# Patient Record
Sex: FEMALE
[De-identification: names, ages, dates, MRNs, and addresses within clinical notes are randomized per-mention and may not be internally consistent; named-entity substitution may affect disease eponyms.]

---

## 2022-12-27 PROBLEM — Z00.00 ENCOUNTER FOR PREVENTIVE HEALTH EXAMINATION: Status: ACTIVE | Noted: 2022-12-27

## 2023-01-04 ENCOUNTER — APPOINTMENT (OUTPATIENT)
Dept: RADIATION ONCOLOGY | Facility: CLINIC | Age: 63
End: 2023-01-04
Payer: MEDICAID

## 2023-01-04 VITALS
TEMPERATURE: 96.98 F | BODY MASS INDEX: 16.11 KG/M2 | DIASTOLIC BLOOD PRESSURE: 88 MMHG | WEIGHT: 102.62 LBS | SYSTOLIC BLOOD PRESSURE: 148 MMHG | HEART RATE: 113 BPM | OXYGEN SATURATION: 100 % | RESPIRATION RATE: 18 BRPM | HEIGHT: 67 IN

## 2023-01-04 DIAGNOSIS — Z78.9 OTHER SPECIFIED HEALTH STATUS: ICD-10-CM

## 2023-01-04 DIAGNOSIS — C53.9 MALIGNANT NEOPLASM OF CERVIX UTERI, UNSPECIFIED: ICD-10-CM

## 2023-01-04 DIAGNOSIS — Z83.3 FAMILY HISTORY OF DIABETES MELLITUS: ICD-10-CM

## 2023-01-04 DIAGNOSIS — Z92.3 PERSONAL HISTORY OF IRRADIATION: ICD-10-CM

## 2023-01-04 DIAGNOSIS — Z86.79 PERSONAL HISTORY OF OTHER DISEASES OF THE CIRCULATORY SYSTEM: ICD-10-CM

## 2023-01-04 DIAGNOSIS — Z92.21 PERSONAL HISTORY OF ANTINEOPLASTIC CHEMOTHERAPY: ICD-10-CM

## 2023-01-04 DIAGNOSIS — Z80.3 FAMILY HISTORY OF MALIGNANT NEOPLASM OF BREAST: ICD-10-CM

## 2023-01-04 PROCEDURE — 99204 OFFICE O/P NEW MOD 45 MIN: CPT | Mod: GC,25

## 2023-01-04 RX ORDER — MAGNESIUM OXIDE 400 MG
400 TABLET ORAL
Refills: 0 | Status: ACTIVE | COMMUNITY

## 2023-01-04 RX ORDER — AMLODIPINE BESYLATE 5 MG/1
5 TABLET ORAL
Refills: 0 | Status: ACTIVE | COMMUNITY

## 2023-01-05 PROBLEM — Z92.21 HISTORY OF ANTINEOPLASTIC CHEMOTHERAPY: Status: ACTIVE | Noted: 2023-01-05

## 2023-01-05 PROBLEM — Z92.3 H/O THERAPEUTIC RADIATION: Status: ACTIVE | Noted: 2023-01-05

## 2023-01-05 PROBLEM — C53.9 CERVICAL CARCINOMA: Status: ACTIVE | Noted: 2023-01-05

## 2023-01-05 NOTE — HISTORY OF PRESENT ILLNESS
[FreeTextEntry1] : Ms. Sana Noel is a 61 yo woman, seen today for second opinion regarding brachytherapy following cisplat/external beam radiation for probable Stage LIGIA cervical cancer \par \par Her HPI is summarized below:\par  admitted to the hospital on 10/11/22 for AMS and was found to have B/L hydronephrosis secondary to a pelvic mass s/p bilateral percutaneous nephrostomy. Cervical mass biopsy was performed along with cystoscopy, proctoscopy and revealed a well-differentiated squamous cell carcinoma and positive malignant cells on cytology review.\par \par 10/11/22: CT ab/pelvis showed a distended bladder with posteroinferior urinary nodular wall thickening near UVJs\par \par 10/12/22 MRI performed revealed an irregular cervical mass likely invading rectum, uterus, and posterior urinary bladder and distal ureters/UVJs. Bilateral moderate hydronephrosis.\par \par 10/18/22: Cervix biopsy showed squamous cell carcinoma, well-differentiated. PD-L1 10 (CPS >1/Expressed)\par \par 10/18/22: Bladder washing positive for malignant cells. Cystoscopy showed a very small area of tumor invasion into bladder mucosa.\par \par 10/21/22: PET/CT irregular active central pelvic mass inseparable from urinary bladder, anterior rectum, and uterus.\par Pelvic mass 53 x 61mm. SUV max 15.4. No lymphadenopathy. No evidence of metastatic disease.\par \par Patient plan discussed at tumor board and consensus was to move forward with definitive chemo RT with cisplatin as long as renal function is ok.\par \par 12/12/22: MRI pelvic w/ and w/o contrast revealed an interval mild decrease in size of irregular cervical mass with improvement in uterine invasion. Persistent invasion along the posterior bladder wall and anterior rectum. Persistent invasion of the distal uterer/UVJ's with improvement in bilateral hydroureter. \par Uterus measures 5.4 x 2.8 x 4.1cm. Post-treatment changes noted within the cervix with irregular circumferential cervical 4.6 x 3.8 x 3.1cm mass which is mildly decreased\par \par . She was started on cisplatin and received RT starting on 11/9/22 with Dr. Genevieve Scott. She completed 6 cycles of cisplatin with concurrent RT, 20% dose reduction due to anemia for C6. She was treated at [facility]. She is now presenting for evaluation and consideration for brachytherapy for locally advanced cervical cancer.\par Patient states that she completed 25 fractions of EBRT and was initially planned for brachytherapy but there was not as much of a response as they would have liked prior to completing brachytherapy per the patient. Patient states that she had no other symptoms: denies vaginal bleeding/spotting, vaginal discharge, dysuria, hematuria. \par Per Dr. Thomason, if further radiation is not an option, systemic therapy would be upscaled to carbo/taxol + Keytruda.\par

## 2023-01-05 NOTE — PHYSICAL EXAM
[Normal] : well developed, well nourished, in no acute distress [Sclera] : the sclera and conjunctiva were normal [Extraocular Movements] : extraocular movements were intact [] : no respiratory distress [No Focal Deficits] : no focal deficits [Oriented To Time, Place, And Person] : oriented to person, place, and time [Abdomen Soft] : soft [No Rectal Mass] : no rectal mass [Normal External Genitalia] : normal external genitalia  [de-identified] : bilateral nephrostomy tubes in place [de-identified] : thickening of anterior vaginal wall distint cervix not appreciated shelf like thickening anteriorlly bilatral parametrial extension to sidewall

## 2023-01-05 NOTE — VITALS
OKAY TO REFILL    I have reviewed information from the [facility]:  Reports are compliant with drug, quantity, prescribe, dispenser, and recipient history.Treatment is appropriate.    LAST REFILL :2/12/18    DONE   [Maximal Pain Intensity: 5/10] : 5/10 [Least Pain Intensity: 1/10] : 1/10 [Pain Description/Quality: ___] : Pain description/quality: [unfilled] [Pain Duration: ___] : Pain duration: [unfilled] [Pain Location: ___] : Pain Location: [unfilled] [OTC] : OTC [90: Able to carry normal activity; minor signs or symptoms of disease.] : 90: Able to carry normal activity; minor signs or symptoms of disease.  [ECOG Performance Status: 1 - Restricted in physically strenuous activity but ambulatory and able to carry out work of a light or sedentary nature] : Performance Status: 1 - Restricted in physically strenuous activity but ambulatory and able to carry out work of a light or sedentary nature, e.g., light house work, office work [Date: ____________] : Patient's last distress assessment performed on [unfilled]. [7 - Distress Level] : Distress Level: 7 [Pain Interferes with ADLs] : Pain does not interfere with activities of daily living [FreeTextEntry7] : Pt refused  referral

## 2023-01-05 NOTE — REVIEW OF SYSTEMS
[Constipation] : constipation [Diarrhea] : diarrhea [Dizziness] : dizziness [Insomnia] : insomnia [Anxiety] : anxiety [Negative] : Allergic/Immunologic [Abdominal Pain] : no abdominal pain [Vomiting] : no vomiting [Confused] : no confusion [Fainting] : no fainting [Difficulty Walking] : no difficulty walking [Suicidal] : not suicidal [Depression] : no depression [FreeTextEntry3] : Pt states having bad eyes and wearing glasses [FreeTextEntry7] : While on chemo  [FreeTextEntry8] : Pt has bilateral nephrostomy  [de-identified] : Insomnia at baseline